# Patient Record
Sex: FEMALE | Race: WHITE | Employment: OTHER | URBAN - METROPOLITAN AREA
[De-identification: names, ages, dates, MRNs, and addresses within clinical notes are randomized per-mention and may not be internally consistent; named-entity substitution may affect disease eponyms.]

---

## 2017-02-08 ENCOUNTER — NURSE NAVIGATOR (OUTPATIENT)
Dept: FAMILY MEDICINE CLINIC | Age: 82
End: 2017-02-08

## 2017-02-08 NOTE — PROGRESS NOTES
2/8/17, Per chart review, patient last seen at [facility]/Dr Warren, 3/30/16. See Previous NN Encounter Notes: 4/26/16 - 5/2/16 at [facility] related to Fall, Syncope, and Uncontrolled Diabetes. Printed H&P and Discharge Summary for Dr Leisa Cruz to review. Patient discharged to [facility]. See previous NN Encounter Notes. Patient S/P Office visit/Dr Warren, 3/30/16, A1C at 12.2, next [facility] Diabetes Class, 4/18/16 at 2:30pm, possible need for [facility] assistance([facility]). 5/11/16, [employer], [phone], spoke to states Martin patient remains at facility, allowed to speak to Social work, Roxana Zaman \[facility] [place] [facility]\"" patient remains max assist at this time, agrees to keep [facility] contact information to call with further updates, questions or concerns. PLAN:  Continue to monitor for discharge    2/8/17, This writer/NN contacted [facility], [phone], spoke to Social Work/states Billie patient remains at [facility], under care of Dr Aga Gonzalez, no need for F/U appt with Dr Leisa Cruz at this time.